# Patient Record
Sex: FEMALE | Race: WHITE | NOT HISPANIC OR LATINO | Employment: FULL TIME | URBAN - METROPOLITAN AREA
[De-identification: names, ages, dates, MRNs, and addresses within clinical notes are randomized per-mention and may not be internally consistent; named-entity substitution may affect disease eponyms.]

---

## 2023-11-03 DIAGNOSIS — F41.9 ANXIETY: ICD-10-CM

## 2023-11-03 RX ORDER — BUPROPION HYDROCHLORIDE 150 MG/1
150 TABLET ORAL
COMMUNITY
End: 2023-11-03 | Stop reason: SDUPTHER

## 2023-11-04 RX ORDER — BUPROPION HYDROCHLORIDE 150 MG/1
150 TABLET ORAL
Qty: 90 TABLET | Refills: 0 | Status: SHIPPED | OUTPATIENT
Start: 2023-11-04 | End: 2023-11-28 | Stop reason: WASHOUT

## 2023-11-24 ENCOUNTER — APPOINTMENT (OUTPATIENT)
Dept: PRIMARY CARE | Facility: CLINIC | Age: 43
End: 2023-11-24
Payer: COMMERCIAL

## 2023-11-28 ENCOUNTER — OFFICE VISIT (OUTPATIENT)
Dept: PRIMARY CARE | Facility: CLINIC | Age: 43
End: 2023-11-28
Payer: COMMERCIAL

## 2023-11-28 VITALS
OXYGEN SATURATION: 98 % | SYSTOLIC BLOOD PRESSURE: 150 MMHG | TEMPERATURE: 97.8 F | HEIGHT: 70 IN | WEIGHT: 205 LBS | BODY MASS INDEX: 29.35 KG/M2 | RESPIRATION RATE: 18 BRPM | DIASTOLIC BLOOD PRESSURE: 94 MMHG | HEART RATE: 88 BPM

## 2023-11-28 DIAGNOSIS — I10 PRIMARY HYPERTENSION: Primary | ICD-10-CM

## 2023-11-28 PROBLEM — T50.902A SUICIDE BY DRUG OVERDOSE (MULTI): Status: RESOLVED | Noted: 2023-11-28 | Resolved: 2023-11-28

## 2023-11-28 PROBLEM — L03.213 PERIORBITAL CELLULITIS: Status: RESOLVED | Noted: 2023-11-28 | Resolved: 2023-11-28

## 2023-11-28 PROBLEM — G47.00 INSOMNIA: Status: ACTIVE | Noted: 2023-11-28

## 2023-11-28 PROBLEM — F41.0 PANIC: Status: ACTIVE | Noted: 2023-11-28

## 2023-11-28 PROBLEM — U07.1 COVID-19: Status: RESOLVED | Noted: 2023-11-28 | Resolved: 2023-11-28

## 2023-11-28 PROBLEM — I80.9 SUPERFICIAL THROMBOPHLEBITIS: Status: RESOLVED | Noted: 2023-11-28 | Resolved: 2023-11-28

## 2023-11-28 PROBLEM — T50.901A DRUG OVERDOSE: Status: RESOLVED | Noted: 2023-11-28 | Resolved: 2023-11-28

## 2023-11-28 PROBLEM — Z78.9 MEDICAL HOME PATIENT: Status: ACTIVE | Noted: 2023-11-28

## 2023-11-28 PROBLEM — S05.00XA CORNEAL ABRASION: Status: RESOLVED | Noted: 2023-11-28 | Resolved: 2023-11-28

## 2023-11-28 PROBLEM — J32.9 CHRONIC SINUSITIS: Status: ACTIVE | Noted: 2023-11-28

## 2023-11-28 PROBLEM — F41.9 ANXIETY: Status: ACTIVE | Noted: 2023-11-28

## 2023-11-28 PROBLEM — J45.909 UNCOMPLICATED ASTHMA (HHS-HCC): Status: ACTIVE | Noted: 2023-11-28

## 2023-11-28 PROBLEM — T50.902A INTENTIONAL DRUG OVERDOSE (MULTI): Status: RESOLVED | Noted: 2023-11-28 | Resolved: 2023-11-28

## 2023-11-28 PROBLEM — R53.83 FATIGUE: Status: RESOLVED | Noted: 2023-11-28 | Resolved: 2023-11-28

## 2023-11-28 PROBLEM — F14.10 COCAINE ABUSE (MULTI): Status: ACTIVE | Noted: 2023-11-28

## 2023-11-28 PROBLEM — F32.A DEPRESSION: Status: ACTIVE | Noted: 2023-11-28

## 2023-11-28 PROBLEM — F10.10 ALCOHOL ABUSE: Status: ACTIVE | Noted: 2023-11-28

## 2023-11-28 PROBLEM — I26.99 PULMONARY EMBOLISM (MULTI): Status: ACTIVE | Noted: 2023-11-28

## 2023-11-28 PROCEDURE — 3077F SYST BP >= 140 MM HG: CPT | Performed by: FAMILY MEDICINE

## 2023-11-28 PROCEDURE — 3080F DIAST BP >= 90 MM HG: CPT | Performed by: FAMILY MEDICINE

## 2023-11-28 PROCEDURE — 99213 OFFICE O/P EST LOW 20 MIN: CPT | Performed by: FAMILY MEDICINE

## 2023-11-28 RX ORDER — NORETHINDRONE ACETATE AND ETHINYL ESTRADIOL AND FERROUS FUMARATE 1MG-20(21)
1 KIT ORAL DAILY
COMMUNITY

## 2023-11-28 RX ORDER — LOSARTAN POTASSIUM 100 MG/1
100 TABLET ORAL DAILY
COMMUNITY
End: 2024-02-22

## 2023-11-28 RX ORDER — TRAZODONE HYDROCHLORIDE 100 MG/1
100 TABLET ORAL NIGHTLY PRN
COMMUNITY
Start: 2023-01-24

## 2023-11-28 RX ORDER — ZOLPIDEM TARTRATE 5 MG/1
5 TABLET ORAL NIGHTLY PRN
COMMUNITY
Start: 2020-01-07

## 2023-11-28 RX ORDER — VALACYCLOVIR HYDROCHLORIDE 1 G/1
1000 TABLET, FILM COATED ORAL DAILY
COMMUNITY
Start: 2023-05-25

## 2023-11-28 RX ORDER — BUPROPION HYDROCHLORIDE 150 MG/1
150 TABLET ORAL DAILY
COMMUNITY
End: 2024-02-07 | Stop reason: SDUPTHER

## 2023-11-28 RX ORDER — DESVENLAFAXINE 100 MG/1
100 TABLET, EXTENDED RELEASE ORAL DAILY
COMMUNITY
Start: 2020-01-07

## 2023-11-28 RX ORDER — HYDROCHLOROTHIAZIDE 25 MG/1
25 TABLET ORAL DAILY
Qty: 30 TABLET | Refills: 11 | Status: SHIPPED | OUTPATIENT
Start: 2023-11-28 | End: 2024-04-22

## 2023-11-28 RX ORDER — LEVONORGESTREL 52 MG/1
1 INTRAUTERINE DEVICE INTRAUTERINE ONCE
COMMUNITY

## 2023-11-28 RX ORDER — ALPRAZOLAM 0.5 MG/1
0.5 TABLET ORAL NIGHTLY PRN
COMMUNITY
Start: 2022-09-15 | End: 2024-05-10 | Stop reason: SDUPTHER

## 2023-11-28 RX ORDER — BUPROPION HYDROCHLORIDE 300 MG/1
300 TABLET ORAL EVERY MORNING
COMMUNITY
Start: 2023-08-31 | End: 2024-02-22

## 2023-11-28 RX ORDER — APIXABAN 5 MG/1
5 TABLET, FILM COATED ORAL 2 TIMES DAILY
COMMUNITY
Start: 2023-02-02

## 2023-11-28 ASSESSMENT — PAIN SCALES - GENERAL: PAINLEVEL: 0-NO PAIN

## 2023-11-28 ASSESSMENT — PATIENT HEALTH QUESTIONNAIRE - PHQ9
SUM OF ALL RESPONSES TO PHQ9 QUESTIONS 1 AND 2: 0
1. LITTLE INTEREST OR PLEASURE IN DOING THINGS: NOT AT ALL
2. FEELING DOWN, DEPRESSED OR HOPELESS: NOT AT ALL

## 2023-11-28 ASSESSMENT — ENCOUNTER SYMPTOMS: HYPERTENSION: 1

## 2023-11-28 NOTE — PROGRESS NOTES
"Subjective   Patient ID: Felicia Feliciano is a 43 y.o. female who presents for Hypertension (PT HERE FOR C/O HYPERTENSION).    Hypertension         Review of Systems    Objective   BP (!) 150/94 (BP Location: Left arm, Patient Position: Sitting, BP Cuff Size: Large adult)   Pulse 88   Temp 36.6 °C (97.8 °F) (Temporal)   Resp 18   Ht 1.778 m (5' 10\")   Wt 93 kg (205 lb)   SpO2 98%   BMI 29.41 kg/m²     Physical Exam  Constitutional:       General: She is not in acute distress.     Appearance: Normal appearance.   Cardiovascular:      Rate and Rhythm: Normal rate and regular rhythm.      Heart sounds: No murmur heard.  Pulmonary:      Breath sounds: Normal breath sounds. No wheezing.   Neurological:      Mental Status: She is alert.         Assessment/Plan   Problem List Items Addressed This Visit             ICD-10-CM    Hypertension - Primary I10          "

## 2024-01-09 ENCOUNTER — APPOINTMENT (OUTPATIENT)
Dept: PRIMARY CARE | Facility: CLINIC | Age: 44
End: 2024-01-09
Payer: COMMERCIAL

## 2024-02-07 DIAGNOSIS — F32.A DEPRESSION, UNSPECIFIED DEPRESSION TYPE: ICD-10-CM

## 2024-02-07 RX ORDER — BUPROPION HYDROCHLORIDE 150 MG/1
150 TABLET ORAL DAILY
Qty: 90 TABLET | Refills: 3 | Status: SHIPPED | OUTPATIENT
Start: 2024-02-07

## 2024-02-13 ENCOUNTER — OFFICE VISIT (OUTPATIENT)
Dept: PRIMARY CARE | Facility: CLINIC | Age: 44
End: 2024-02-13
Payer: COMMERCIAL

## 2024-02-13 VITALS
RESPIRATION RATE: 18 BRPM | HEIGHT: 70 IN | SYSTOLIC BLOOD PRESSURE: 132 MMHG | WEIGHT: 208 LBS | TEMPERATURE: 98.7 F | OXYGEN SATURATION: 98 % | BODY MASS INDEX: 29.78 KG/M2 | HEART RATE: 73 BPM | DIASTOLIC BLOOD PRESSURE: 84 MMHG

## 2024-02-13 DIAGNOSIS — Z00.00 ANNUAL PHYSICAL EXAM: Primary | ICD-10-CM

## 2024-02-13 DIAGNOSIS — Z12.31 BREAST CANCER SCREENING BY MAMMOGRAM: ICD-10-CM

## 2024-02-13 DIAGNOSIS — Z12.31 ENCOUNTER FOR SCREENING MAMMOGRAM FOR MALIGNANT NEOPLASM OF BREAST: ICD-10-CM

## 2024-02-13 PROCEDURE — 3079F DIAST BP 80-89 MM HG: CPT | Performed by: FAMILY MEDICINE

## 2024-02-13 PROCEDURE — 36415 COLL VENOUS BLD VENIPUNCTURE: CPT | Performed by: FAMILY MEDICINE

## 2024-02-13 PROCEDURE — 1036F TOBACCO NON-USER: CPT | Performed by: FAMILY MEDICINE

## 2024-02-13 PROCEDURE — 84443 ASSAY THYROID STIM HORMONE: CPT | Performed by: FAMILY MEDICINE

## 2024-02-13 PROCEDURE — 80061 LIPID PANEL: CPT | Performed by: FAMILY MEDICINE

## 2024-02-13 PROCEDURE — 99396 PREV VISIT EST AGE 40-64: CPT | Performed by: FAMILY MEDICINE

## 2024-02-13 PROCEDURE — 3075F SYST BP GE 130 - 139MM HG: CPT | Performed by: FAMILY MEDICINE

## 2024-02-13 PROCEDURE — 80053 COMPREHEN METABOLIC PANEL: CPT | Performed by: FAMILY MEDICINE

## 2024-02-13 PROCEDURE — 85025 COMPLETE CBC W/AUTO DIFF WBC: CPT | Performed by: FAMILY MEDICINE

## 2024-02-13 ASSESSMENT — PATIENT HEALTH QUESTIONNAIRE - PHQ9
1. LITTLE INTEREST OR PLEASURE IN DOING THINGS: NOT AT ALL
SUM OF ALL RESPONSES TO PHQ9 QUESTIONS 1 AND 2: 0
2. FEELING DOWN, DEPRESSED OR HOPELESS: NOT AT ALL

## 2024-02-13 ASSESSMENT — PAIN SCALES - GENERAL: PAINLEVEL: 0-NO PAIN

## 2024-02-13 NOTE — PROGRESS NOTES
"Subjective   Patient ID: Felicia Feliciano is a 43 y.o. female who presents for Annual Exam (Pt here for physical).    HPI     Review of Systems    Objective   /84   Pulse 73   Temp 37.1 °C (98.7 °F) (Temporal)   Resp 18   Ht 1.778 m (5' 10\")   Wt 94.3 kg (208 lb)   SpO2 98%   BMI 29.84 kg/m²     Physical Exam  Vitals and nursing note reviewed.   Constitutional:       General: She is not in acute distress.  HENT:      Right Ear: Tympanic membrane and ear canal normal.      Left Ear: Tympanic membrane and ear canal normal.      Nose: Nose normal. No rhinorrhea.      Mouth/Throat:      Pharynx: Oropharynx is clear. No oropharyngeal exudate or posterior oropharyngeal erythema.      Comments: Dentition wnl  Eyes:      Extraocular Movements: Extraocular movements intact.      Conjunctiva/sclera: Conjunctivae normal.      Pupils: Pupils are equal, round, and reactive to light.   Neck:      Vascular: No carotid bruit.   Cardiovascular:      Rate and Rhythm: Normal rate and regular rhythm.      Heart sounds: Normal heart sounds. No murmur heard.  Pulmonary:      Breath sounds: Normal breath sounds. No wheezing or rhonchi.   Abdominal:      General: Bowel sounds are normal. There is no distension.      Palpations: Abdomen is soft. There is no mass.      Tenderness: There is no abdominal tenderness. There is no guarding or rebound.      Hernia: No hernia is present.   Musculoskeletal:         General: No swelling or tenderness. Normal range of motion.      Cervical back: Normal range of motion and neck supple.   Lymphadenopathy:      Cervical: No cervical adenopathy.   Skin:     General: Skin is warm.      Findings: No rash.   Neurological:      General: No focal deficit present.      Mental Status: She is alert.         Assessment/Plan   Problem List Items Addressed This Visit             ICD-10-CM    Annual physical exam - Primary Z00.00    Relevant Orders    CBC and Auto Differential    Comprehensive Metabolic " Panel    Lipid Panel    TSH with reflex to Free T4 if abnormal    Encounter for screening mammogram for malignant neoplasm of breast Z12.31     Other Visit Diagnoses         Codes    Breast cancer screening by mammogram     Z12.31    Relevant Orders    BI mammo bilateral screening tomosynthesis

## 2024-02-14 LAB
ALBUMIN SERPL-MCNC: 4.6 G/DL (ref 3.5–5)
ALP BLD-CCNC: 71 U/L (ref 35–125)
ALT SERPL-CCNC: 13 U/L (ref 5–40)
ANION GAP SERPL CALC-SCNC: 15 MMOL/L
AST SERPL-CCNC: 19 U/L (ref 5–40)
BASOPHILS # BLD AUTO: 0.06 X10*3/UL (ref 0–0.1)
BASOPHILS NFR BLD AUTO: 0.8 %
BILIRUB SERPL-MCNC: 0.3 MG/DL (ref 0.1–1.2)
BUN SERPL-MCNC: 17 MG/DL (ref 8–25)
CALCIUM SERPL-MCNC: 9.7 MG/DL (ref 8.5–10.4)
CHLORIDE SERPL-SCNC: 101 MMOL/L (ref 97–107)
CHOLEST SERPL-MCNC: 220 MG/DL (ref 133–200)
CHOLEST/HDLC SERPL: 2.4 {RATIO}
CO2 SERPL-SCNC: 24 MMOL/L (ref 24–31)
CREAT SERPL-MCNC: 0.9 MG/DL (ref 0.4–1.6)
EGFRCR SERPLBLD CKD-EPI 2021: 81 ML/MIN/1.73M*2
EOSINOPHIL # BLD AUTO: 0.08 X10*3/UL (ref 0–0.7)
EOSINOPHIL NFR BLD AUTO: 1.1 %
ERYTHROCYTE [DISTWIDTH] IN BLOOD BY AUTOMATED COUNT: 13 % (ref 11.5–14.5)
GLUCOSE SERPL-MCNC: 85 MG/DL (ref 65–99)
HCT VFR BLD AUTO: 40.5 % (ref 36–46)
HDLC SERPL-MCNC: 90 MG/DL
HGB BLD-MCNC: 13.1 G/DL (ref 12–16)
IMM GRANULOCYTES # BLD AUTO: 0.05 X10*3/UL (ref 0–0.7)
IMM GRANULOCYTES NFR BLD AUTO: 0.7 % (ref 0–0.9)
LDLC SERPL CALC-MCNC: 115 MG/DL (ref 65–130)
LYMPHOCYTES # BLD AUTO: 1.95 X10*3/UL (ref 1.2–4.8)
LYMPHOCYTES NFR BLD AUTO: 26.1 %
MCH RBC QN AUTO: 30.8 PG (ref 26–34)
MCHC RBC AUTO-ENTMCNC: 32.3 G/DL (ref 32–36)
MCV RBC AUTO: 95 FL (ref 80–100)
MONOCYTES # BLD AUTO: 0.37 X10*3/UL (ref 0.1–1)
MONOCYTES NFR BLD AUTO: 5 %
NEUTROPHILS # BLD AUTO: 4.96 X10*3/UL (ref 1.2–7.7)
NEUTROPHILS NFR BLD AUTO: 66.3 %
NRBC BLD-RTO: 0 /100 WBCS (ref 0–0)
PLATELET # BLD AUTO: 285 X10*3/UL (ref 150–450)
POTASSIUM SERPL-SCNC: 4.1 MMOL/L (ref 3.4–5.1)
PROT SERPL-MCNC: 7.2 G/DL (ref 5.9–7.9)
RBC # BLD AUTO: 4.25 X10*6/UL (ref 4–5.2)
SODIUM SERPL-SCNC: 140 MMOL/L (ref 133–145)
TRIGL SERPL-MCNC: 77 MG/DL (ref 40–150)
TSH SERPL DL<=0.05 MIU/L-ACNC: 1.66 MIU/L (ref 0.27–4.2)
WBC # BLD AUTO: 7.5 X10*3/UL (ref 4.4–11.3)

## 2024-02-22 DIAGNOSIS — I10 PRIMARY HYPERTENSION: Primary | ICD-10-CM

## 2024-02-22 RX ORDER — BUPROPION HYDROCHLORIDE 300 MG/1
TABLET ORAL
Qty: 90 TABLET | Refills: 1 | Status: SHIPPED | OUTPATIENT
Start: 2024-02-22

## 2024-02-22 RX ORDER — LOSARTAN POTASSIUM 100 MG/1
100 TABLET ORAL DAILY
Qty: 90 TABLET | Refills: 1 | Status: SHIPPED | OUTPATIENT
Start: 2024-02-22 | End: 2024-08-20

## 2024-04-18 DIAGNOSIS — I10 PRIMARY HYPERTENSION: Primary | ICD-10-CM

## 2024-04-22 RX ORDER — HYDROCHLOROTHIAZIDE 25 MG/1
25 TABLET ORAL DAILY
Qty: 90 TABLET | Refills: 1 | Status: SHIPPED | OUTPATIENT
Start: 2024-04-22

## 2024-05-10 DIAGNOSIS — F41.9 ANXIETY: ICD-10-CM

## 2024-05-10 RX ORDER — ALPRAZOLAM 0.5 MG/1
0.5 TABLET ORAL NIGHTLY PRN
Qty: 30 TABLET | Refills: 0 | Status: SHIPPED | OUTPATIENT
Start: 2024-05-10 | End: 2024-06-09

## 2024-08-22 DIAGNOSIS — I10 PRIMARY HYPERTENSION: ICD-10-CM

## 2024-08-22 RX ORDER — LOSARTAN POTASSIUM 100 MG/1
100 TABLET ORAL DAILY
Qty: 90 TABLET | Refills: 1 | Status: SHIPPED | OUTPATIENT
Start: 2024-08-22

## 2024-08-22 RX ORDER — BUPROPION HYDROCHLORIDE 300 MG/1
TABLET ORAL
Qty: 90 TABLET | Refills: 1 | Status: SHIPPED | OUTPATIENT
Start: 2024-08-22

## 2024-10-27 DIAGNOSIS — I10 PRIMARY HYPERTENSION: ICD-10-CM

## 2024-10-29 RX ORDER — HYDROCHLOROTHIAZIDE 25 MG/1
25 TABLET ORAL DAILY
Qty: 90 TABLET | Refills: 1 | Status: SHIPPED | OUTPATIENT
Start: 2024-10-29

## 2024-11-21 DIAGNOSIS — F32.A DEPRESSION, UNSPECIFIED DEPRESSION TYPE: ICD-10-CM

## 2024-11-21 RX ORDER — BUPROPION HYDROCHLORIDE 150 MG/1
150 TABLET ORAL DAILY
Qty: 90 TABLET | Refills: 3 | Status: SHIPPED | OUTPATIENT
Start: 2024-11-21

## 2024-12-10 ENCOUNTER — OFFICE VISIT (OUTPATIENT)
Dept: PRIMARY CARE | Facility: CLINIC | Age: 44
End: 2024-12-10
Payer: COMMERCIAL

## 2024-12-10 VITALS
SYSTOLIC BLOOD PRESSURE: 124 MMHG | RESPIRATION RATE: 18 BRPM | HEART RATE: 84 BPM | DIASTOLIC BLOOD PRESSURE: 86 MMHG | HEIGHT: 70 IN | WEIGHT: 200.4 LBS | OXYGEN SATURATION: 99 % | TEMPERATURE: 97.9 F | BODY MASS INDEX: 28.69 KG/M2

## 2024-12-10 DIAGNOSIS — R20.2 TINGLING IN EXTREMITIES: ICD-10-CM

## 2024-12-10 DIAGNOSIS — M54.32 SCIATICA OF LEFT SIDE: Primary | ICD-10-CM

## 2024-12-10 PROCEDURE — 3008F BODY MASS INDEX DOCD: CPT | Performed by: NURSE PRACTITIONER

## 2024-12-10 PROCEDURE — 3079F DIAST BP 80-89 MM HG: CPT | Performed by: NURSE PRACTITIONER

## 2024-12-10 PROCEDURE — 3074F SYST BP LT 130 MM HG: CPT | Performed by: NURSE PRACTITIONER

## 2024-12-10 PROCEDURE — 1036F TOBACCO NON-USER: CPT | Performed by: NURSE PRACTITIONER

## 2024-12-10 PROCEDURE — 99213 OFFICE O/P EST LOW 20 MIN: CPT | Performed by: NURSE PRACTITIONER

## 2024-12-10 RX ORDER — DICLOFENAC SODIUM 10 MG/G
4 GEL TOPICAL 4 TIMES DAILY
Qty: 100 G | Refills: 1 | Status: SHIPPED | OUTPATIENT
Start: 2024-12-10

## 2024-12-10 RX ORDER — CYCLOBENZAPRINE HCL 10 MG
10 TABLET ORAL NIGHTLY PRN
Qty: 30 TABLET | Refills: 0 | Status: SHIPPED | OUTPATIENT
Start: 2024-12-10 | End: 2025-02-08

## 2024-12-10 RX ORDER — KETOROLAC TROMETHAMINE 30 MG/ML
60 INJECTION, SOLUTION INTRAMUSCULAR; INTRAVENOUS ONCE
Status: COMPLETED | OUTPATIENT
Start: 2024-12-10 | End: 2024-12-10

## 2024-12-10 RX ORDER — PREDNISONE 20 MG/1
TABLET ORAL
Qty: 21 TABLET | Refills: 0 | Status: SHIPPED | OUTPATIENT
Start: 2024-12-10

## 2024-12-10 ASSESSMENT — PATIENT HEALTH QUESTIONNAIRE - PHQ9
SUM OF ALL RESPONSES TO PHQ9 QUESTIONS 1 AND 2: 0
2. FEELING DOWN, DEPRESSED OR HOPELESS: NOT AT ALL
1. LITTLE INTEREST OR PLEASURE IN DOING THINGS: NOT AT ALL

## 2024-12-10 ASSESSMENT — PAIN SCALES - GENERAL: PAINLEVEL_OUTOF10: 7

## 2024-12-10 NOTE — PROGRESS NOTES
"Subjective   Patient ID: Felicia Feliciano is a 44 y.o. female who presents for Sciatica (PT C/O LEFT SCIATIC PAIN AND FOOT NUMBNESS X 7 DAYS. PT THINKS COULD BE RELATED TO WALKING IN SNOW ).    PT HAS SCITICA FOR OVER A WEEK STARTED ONLEFT SIDE GOES DOWN INTO LEFT FOOT, HAS HAD THIS BEFORE,     Neuropathy    The onset of symptoms is sudden. It is located in the LLE region. The distribution is worse on left. The patient experiences pain all day. Severity of pain: severe. Quality of pain: aching and numbing.       Additional narrative: PT WALKS SHE IS A , WAS WALKING LAST WEEK IN TEN FOOT OF SNOW,       Review of Systems    Objective   /86   Pulse 84   Temp 36.6 °C (97.9 °F)   Resp 18   Ht 1.778 m (5' 10\")   Wt 90.9 kg (200 lb 6.4 oz)   LMP  (LMP Unknown)   SpO2 99%   BMI 28.75 kg/m²     Physical Exam  Constitutional:       Appearance: Normal appearance.   Musculoskeletal:         General: Tenderness present. Normal range of motion.      Comments: REPRODUCIBLE PAIN IN LEFT BUTT OX THAT RADIATES DOWN L  LEG TINGLING  NUMBING   Neurological:      Mental Status: She is alert and oriented to person, place, and time.   Psychiatric:         Behavior: Behavior normal.         Assessment/Plan   Problem List Items Addressed This Visit    None  Visit Diagnoses         Codes    Sciatica of left side    -  Primary M54.32    Relevant Medications    predniSONE (Deltasone) 20 mg tablet    cyclobenzaprine (Flexeril) 10 mg tablet    ketorolac (Toradol) injection 60 mg (Completed)    diclofenac sodium (Voltaren) 1 % gel    Other Relevant Orders    XR lumbar spine complete 4+ views    Tingling in extremities     R20.2    Relevant Medications    cyclobenzaprine (Flexeril) 10 mg tablet    Other Relevant Orders    XR lumbar spine complete 4+ views        Call if not better       "

## 2024-12-19 DIAGNOSIS — F41.9 ANXIETY: ICD-10-CM

## 2024-12-19 RX ORDER — ALPRAZOLAM 0.5 MG/1
0.5 TABLET ORAL NIGHTLY PRN
Qty: 15 TABLET | Refills: 0 | Status: SHIPPED | OUTPATIENT
Start: 2024-12-19 | End: 2025-01-18

## 2025-02-13 ENCOUNTER — OFFICE VISIT (OUTPATIENT)
Dept: PRIMARY CARE | Facility: CLINIC | Age: 45
End: 2025-02-13
Payer: COMMERCIAL

## 2025-02-13 VITALS
OXYGEN SATURATION: 99 % | BODY MASS INDEX: 29.36 KG/M2 | TEMPERATURE: 98.5 F | RESPIRATION RATE: 18 BRPM | SYSTOLIC BLOOD PRESSURE: 120 MMHG | HEART RATE: 101 BPM | WEIGHT: 204.6 LBS | DIASTOLIC BLOOD PRESSURE: 72 MMHG

## 2025-02-13 DIAGNOSIS — J11.1 INFLUENZA: Primary | ICD-10-CM

## 2025-02-13 PROCEDURE — 99213 OFFICE O/P EST LOW 20 MIN: CPT | Performed by: FAMILY MEDICINE

## 2025-02-13 PROCEDURE — 3078F DIAST BP <80 MM HG: CPT | Performed by: FAMILY MEDICINE

## 2025-02-13 PROCEDURE — 1036F TOBACCO NON-USER: CPT | Performed by: FAMILY MEDICINE

## 2025-02-13 PROCEDURE — 3074F SYST BP LT 130 MM HG: CPT | Performed by: FAMILY MEDICINE

## 2025-02-13 ASSESSMENT — ENCOUNTER SYMPTOMS
COUGH: 1
OCCASIONAL FEELINGS OF UNSTEADINESS: 0
DIARRHEA: 1
DEPRESSION: 0
LOSS OF SENSATION IN FEET: 0

## 2025-02-13 ASSESSMENT — PAIN SCALES - GENERAL: PAINLEVEL_OUTOF10: 0-NO PAIN

## 2025-02-13 ASSESSMENT — PATIENT HEALTH QUESTIONNAIRE - PHQ9
2. FEELING DOWN, DEPRESSED OR HOPELESS: NOT AT ALL
SUM OF ALL RESPONSES TO PHQ9 QUESTIONS 1 AND 2: 0
1. LITTLE INTEREST OR PLEASURE IN DOING THINGS: NOT AT ALL

## 2025-02-13 NOTE — LETTER
February 13, 2025     Patient: Felicia Feliciano   YOB: 1980   Date of Visit: 2/13/2025       To Whom It May Concern:    Felicia Feliciano was seen in my clinic on 2/13/2025 at 12:45 pm. Please excuse Felicia for her absence from work she will be able to return on 2/17/25.    If you have any questions or concerns, please don't hesitate to call.         Sincerely,         Francois Yoder, DO        CC: No Recipients

## 2025-02-13 NOTE — PROGRESS NOTES
Subjective   Patient ID: Felicia Feliciano is a 44 y.o. female who presents for Sinusitis (Pt states that she has been dealing with sinus congestion and cough for over a week. ), Cough, and Diarrhea (Pt said that she did have diarrhea for a few days but has cleared up. ).    Sinusitis  Associated symptoms include coughing.   Cough    Diarrhea   Associated symptoms include coughing.        Review of Systems   Respiratory:  Positive for cough.    Gastrointestinal:  Positive for diarrhea.       Objective   /72   Pulse 101   Temp 36.9 °C (98.5 °F)   Resp 18   Wt 92.8 kg (204 lb 9.6 oz)   SpO2 99%   BMI 29.36 kg/m²     Physical Exam  Constitutional:       General: She is not in acute distress.     Appearance: Normal appearance.   Cardiovascular:      Rate and Rhythm: Normal rate and regular rhythm.      Heart sounds: No murmur heard.  Pulmonary:      Breath sounds: Normal breath sounds. No wheezing.   Neurological:      Mental Status: She is alert.         Assessment/Plan   Problem List Items Addressed This Visit    None  Visit Diagnoses         Codes    Influenza    -  Primary J11.1        Supportive tx. Resolving. Rtw monday

## 2025-02-21 DIAGNOSIS — I10 PRIMARY HYPERTENSION: ICD-10-CM

## 2025-02-21 RX ORDER — BUPROPION HYDROCHLORIDE 300 MG/1
TABLET ORAL
Qty: 90 TABLET | Refills: 1 | Status: SHIPPED | OUTPATIENT
Start: 2025-02-21

## 2025-02-21 RX ORDER — LOSARTAN POTASSIUM 100 MG/1
100 TABLET ORAL DAILY
Qty: 90 TABLET | Refills: 1 | Status: SHIPPED | OUTPATIENT
Start: 2025-02-21

## 2025-03-08 DIAGNOSIS — F41.9 ANXIETY: ICD-10-CM

## 2025-03-11 RX ORDER — ALPRAZOLAM 0.5 MG/1
0.5 TABLET ORAL NIGHTLY PRN
Qty: 15 TABLET | Refills: 0 | Status: SHIPPED | OUTPATIENT
Start: 2025-03-11 | End: 2025-04-10

## 2025-05-02 DIAGNOSIS — I10 PRIMARY HYPERTENSION: ICD-10-CM

## 2025-05-02 RX ORDER — HYDROCHLOROTHIAZIDE 25 MG/1
25 TABLET ORAL DAILY
Qty: 90 TABLET | Refills: 1 | Status: SHIPPED | OUTPATIENT
Start: 2025-05-02

## 2025-06-10 DIAGNOSIS — Z12.31 ENCOUNTER FOR SCREENING MAMMOGRAM FOR BREAST CANCER: ICD-10-CM

## 2025-06-17 ASSESSMENT — PROMIS GLOBAL HEALTH SCALE
RATE_PHYSICAL_HEALTH: GOOD
RATE_SOCIAL_SATISFACTION: FAIR
EMOTIONAL_PROBLEMS: SOMETIMES
RATE_MENTAL_HEALTH: GOOD
RATE_AVERAGE_PAIN: 0
RATE_SOCIAL_SATISFACTION: FAIR
RATE_QUALITY_OF_LIFE: GOOD
RATE_QUALITY_OF_LIFE: GOOD
EMOTIONAL_PROBLEMS: SOMETIMES
RATE_AVERAGE_FATIGUE: MODERATE
RATE_SOCIAL_SATISFACTION: FAIR
RATE_AVERAGE_PAIN: 0
RATE_GENERAL_HEALTH: GOOD
CARRYOUT_SOCIAL_ACTIVITIES: GOOD
RATE_MENTAL_HEALTH: GOOD
RATE_MENTAL_HEALTH: GOOD
RATE_AVERAGE_FATIGUE: MODERATE
RATE_AVERAGE_PAIN: 0
RATE_PHYSICAL_HEALTH: GOOD
CARRYOUT_PHYSICAL_ACTIVITIES: COMPLETELY
CARRYOUT_SOCIAL_ACTIVITIES: GOOD
RATE_AVERAGE_FATIGUE: MODERATE
RATE_AVERAGE_PAIN: 0
RATE_MENTAL_HEALTH: GOOD
CARRYOUT_PHYSICAL_ACTIVITIES: COMPLETELY
RATE_QUALITY_OF_LIFE: GOOD
RATE_GENERAL_HEALTH: GOOD
RATE_PHYSICAL_HEALTH: GOOD
RATE_GENERAL_HEALTH: GOOD
CARRYOUT_SOCIAL_ACTIVITIES: GOOD
RATE_PHYSICAL_HEALTH: GOOD
CARRYOUT_SOCIAL_ACTIVITIES: GOOD
EMOTIONAL_PROBLEMS: SOMETIMES
RATE_SOCIAL_SATISFACTION: FAIR
EMOTIONAL_PROBLEMS: SOMETIMES
RATE_GENERAL_HEALTH: GOOD
RATE_QUALITY_OF_LIFE: GOOD
RATE_AVERAGE_FATIGUE: MODERATE

## 2025-06-24 ENCOUNTER — APPOINTMENT (OUTPATIENT)
Dept: PRIMARY CARE | Facility: CLINIC | Age: 45
End: 2025-06-24
Payer: COMMERCIAL

## 2025-08-21 ENCOUNTER — APPOINTMENT (OUTPATIENT)
Dept: PRIMARY CARE | Facility: CLINIC | Age: 45
End: 2025-08-21
Payer: COMMERCIAL

## 2025-08-22 DIAGNOSIS — I10 PRIMARY HYPERTENSION: ICD-10-CM

## 2025-08-22 RX ORDER — LOSARTAN POTASSIUM 100 MG/1
100 TABLET ORAL DAILY
Qty: 90 TABLET | Refills: 1 | Status: SHIPPED | OUTPATIENT
Start: 2025-08-22

## 2025-08-26 DIAGNOSIS — F41.9 ANXIETY: ICD-10-CM

## 2025-08-28 RX ORDER — ALPRAZOLAM 0.5 MG/1
0.5 TABLET ORAL NIGHTLY PRN
Qty: 15 TABLET | Refills: 0 | Status: SHIPPED | OUTPATIENT
Start: 2025-08-28 | End: 2025-09-27

## 2025-09-08 ENCOUNTER — APPOINTMENT (OUTPATIENT)
Dept: PRIMARY CARE | Facility: CLINIC | Age: 45
End: 2025-09-08
Payer: COMMERCIAL

## 2025-09-22 ENCOUNTER — APPOINTMENT (OUTPATIENT)
Dept: PRIMARY CARE | Facility: CLINIC | Age: 45
End: 2025-09-22
Payer: COMMERCIAL